# Patient Record
(demographics unavailable — no encounter records)

---

## 2024-11-06 NOTE — ASSESSMENT
[FreeTextEntry1] : Osteoporosis Secondary causes are age related    Bone density 8/2024 -2.5 in the femoral neck. FRAX Major fracture 9, hip fracture 1.8 Next bone density 8/2026 Patient was encouraged to take VIT D 2000 daily  Patient was encouraged to continue taking Calcium from the diet and supplements to a total of 1200 mg daily as per AOC As per AACE guidelines  patients (post-menopausal women specifically) to maintain adequate dietary intake of calcium, to a total intake (including diet plus supplement, if needed) of 1,200 mg/day.   Patient was counseled on limiting alcohol intake, harms of smoking and importance of weight bearing exercises.     We discussed today different treatment regimens including bisphosphonate, Prolia and anabolics We also discussed the benefit and side effect that including Bisphosphonates (osteonecrosis of the jaw, atypical femur fractures)   Counseled on pill technique if Fosamax is desired (empty stomach, sit/stand for 60 mins after taking med)  See chart note for plans of treatment      I spent 40 minutes discussing with patient face to face and non face to face reviewing documentations, labs, and/or imaging, also discussing the management plans.   RTC in 3 months for 30 min
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
## 2025-04-22 NOTE — ASSESSMENT
[FreeTextEntry1] : Osteoporosis Secondary causes are age related  We had extensive discussion about meds, patient's  says that she has non healing vertebral fracture so we are leaning Forteo first, will touch base with Dr. Jaramillo,    Bone density 8/2024 -2.5 in the femoral neck. FRAX Major fracture 9, hip fracture 1.8 Next bone density 8/2026 Patient was encouraged to take VIT D 2000 daily  Patient was encouraged to continue taking Calcium from the diet and supplements to a total of 1200 mg daily as per AOC As per AACE guidelines  patients (post-menopausal women specifically) to maintain adequate dietary intake of calcium, to a total intake (including diet plus supplement, if needed) of 1,200 mg/day.   Patient was counseled on limiting alcohol intake, harms of smoking and importance of weight bearing exercises.     We discussed today different treatment regimens including bisphosphonate, Prolia and anabolics We also discussed the benefit and side effect that including Bisphosphonates (osteonecrosis of the jaw, atypical femur fractures)   Counseled on pill technique if Fosamax is desired (empty stomach, sit/stand for 60 mins after taking med)  First choice if surgery is planned forteo  Teriparatide is synthetic parathyroid hormone that works as bone builder. It is used in the form of subcutaneous injection in the abdomen or the thigh, once daily, pen will last for 28 doses, when not used please refrigerate. Teriparatide can cause orthostatic hypotension, so it is advisable to take while sitting. There are videos on the company website and other platforms on how to use teriparatide for reference.  Because it is a bone builder, it is not advisable to be used in patients with history of cancer, radiation, unexplained elevated Alkaline phosphatase (bone marker) or Paget's disease due to theoretical increased risk of cancer. It is also not advised to be used in patients with history of hyperparathyroidism. Patient should be on Vit D supplements and consuming enough calcium 1200 mg daily from the diet and supplements.     I spent 30 minutes discussing with patient face to face and non face to face reviewing documentations, labs, and/or imaging, also discussing the management plans.   RTC in 3 months for 30 min
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
## 2025-05-19 NOTE — ASSESSMENT
[FreeTextEntry1] : Osteoporosis Secondary causes are age related  We had extensive discussion about meds. Stu start Forteo  No history of cancer or radiation  Alk phos normal    Bone density 8/2024 -2.5 in the femoral neck. FRAX Major fracture 9, hip fracture 1.8 Next bone density 8/2026 Patient was encouraged to take VIT D 5000 daily  Patient was encouraged to continue taking Calcium from the diet and supplements to a total of 1200 mg daily as per AOC As per AACE guidelines  patients (post-menopausal women specifically) to maintain adequate dietary intake of calcium, to a total intake (including diet plus supplement, if needed) of 1,200 mg/day.   Teriparatide is synthetic parathyroid hormone that works as bone builder. It is used in the form of subcutaneous injection in the abdomen or the thigh, once daily, pen will last for 28 doses, when not used please refrigerate. Teriparatide can cause orthostatic hypotension, so it is advisable to take while sitting. There are videos on the company website and other platforms on how to use teriparatide for reference.   Because it is a bone builder, it is not advisable to be used in patients with history of cancer, radiation, unexplained elevated Alkaline phosphatase (bone marker) or Paget's disease due to theoretical increased risk of cancer. It is also not advised to be used in patients with history of hyperparathyroidism. Patient should be on Vit D supplements and consuming enough calcium 1200 mg daily from the diet and supplements.  RTC in 6 months for 30 min
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
